# Patient Record
Sex: MALE | URBAN - METROPOLITAN AREA
[De-identification: names, ages, dates, MRNs, and addresses within clinical notes are randomized per-mention and may not be internally consistent; named-entity substitution may affect disease eponyms.]

---

## 2021-01-07 ENCOUNTER — APPOINTMENT (RX ONLY)
Dept: URBAN - METROPOLITAN AREA CLINIC 88 | Facility: CLINIC | Age: 26
Setting detail: DERMATOLOGY
End: 2021-01-07

## 2021-01-07 DIAGNOSIS — L66.11 CLASSIC LICHEN PLANOPILARIS: ICD-10-CM

## 2021-01-07 DIAGNOSIS — L64.8 OTHER ANDROGENIC ALOPECIA: ICD-10-CM

## 2021-01-07 DIAGNOSIS — L66.1 LICHEN PLANOPILARIS: ICD-10-CM

## 2021-01-07 PROBLEM — L30.9 DERMATITIS, UNSPECIFIED: Status: ACTIVE | Noted: 2021-01-07

## 2021-01-07 PROCEDURE — ? TREATMENT REGIMEN

## 2021-01-07 PROCEDURE — ? ADDITIONAL NOTES

## 2021-01-07 PROCEDURE — 99202 OFFICE O/P NEW SF 15 MIN: CPT | Mod: 25

## 2021-01-07 PROCEDURE — ? PRESCRIPTION

## 2021-01-07 PROCEDURE — ? COUNSELING

## 2021-01-07 PROCEDURE — ? BIOPSY BY PUNCH METHOD

## 2021-01-07 PROCEDURE — 11104 PUNCH BX SKIN SINGLE LESION: CPT

## 2021-01-07 RX ORDER — FINASTERIDE 1 MG/1
TABLET, FILM COATED ORAL QD
Qty: 90 | Refills: 1 | Status: ERX | COMMUNITY
Start: 2021-01-07

## 2021-01-07 RX ADMIN — FINASTERIDE: 1 TABLET, FILM COATED ORAL at 00:00

## 2021-01-07 ASSESSMENT — LOCATION DETAILED DESCRIPTION DERM
LOCATION DETAILED: LEFT CENTRAL FRONTAL SCALP
LOCATION DETAILED: RIGHT SUPERIOR MEDIAL FOREHEAD
LOCATION DETAILED: SUPERIOR MID FOREHEAD
LOCATION DETAILED: RIGHT CENTRAL FRONTAL SCALP

## 2021-01-07 ASSESSMENT — LOCATION SIMPLE DESCRIPTION DERM
LOCATION SIMPLE: SUPERIOR FOREHEAD
LOCATION SIMPLE: RIGHT SCALP
LOCATION SIMPLE: LEFT SCALP
LOCATION SIMPLE: RIGHT FOREHEAD

## 2021-01-07 ASSESSMENT — LOCATION ZONE DERM
LOCATION ZONE: FACE
LOCATION ZONE: SCALP

## 2021-01-07 NOTE — PROCEDURE: ADDITIONAL NOTES
Detail Level: Simple
Additional Notes: Hair loss not typical for male pattern baldness
Render Risk Assessment In Note?: no

## 2021-01-07 NOTE — PROCEDURE: BIOPSY BY PUNCH METHOD
Epidermal Sutures: 4-0 Nylon
Dressing: bandage
Was A Bandage Applied: Yes
Size Of Lesion In Cm (Optional): 0
Anesthesia Volume In Cc: 0.5
Post-Care Instructions: I reviewed with the patient in detail post-care instructions. Patient is to keep the biopsy site dry overnight, and then apply bacitracin twice daily until healed. Patient may apply hydrogen peroxide soaks to remove any crusting.
Patient Will Remove Sutures At Home?: No
Anesthesia Type: 1% lidocaine with epinephrine and a 1:10 solution of 8.4% sodium bicarbonate
Biopsy Type: H and E
Notification Instructions: Patient will be notified of biopsy results. However, patient instructed to call the office if not contacted within 2 weeks.
Wound Care: Petrolatum
Consent: Written consent was obtained and risks were reviewed including but not limited to scarring, infection, bleeding, scabbing, incomplete removal, nerve damage and allergy to anesthesia.
Suture Removal: 14 days
Detail Level: Detailed
Information: Selecting Yes will display possible errors in your note based on the variables you have selected. This validation is only offered as a suggestion for you. PLEASE NOTE THAT THE VALIDATION TEXT WILL BE REMOVED WHEN YOU FINALIZE YOUR NOTE. IF YOU WANT TO FAX A PRELIMINARY NOTE YOU WILL NEED TO TOGGLE THIS TO 'NO' IF YOU DO NOT WANT IT IN YOUR FAXED NOTE.
Billing Type: Third-Party Bill
Hemostasis: Pressure
Home Suture Removal Text: Patient was provided a home suture removal kit and will remove their sutures at home.  If they have any questions or difficulties they will call the office.
Punch Size In Mm: 4

## 2021-01-14 ENCOUNTER — APPOINTMENT (RX ONLY)
Dept: URBAN - METROPOLITAN AREA CLINIC 88 | Facility: CLINIC | Age: 26
Setting detail: DERMATOLOGY
End: 2021-01-14

## 2021-01-14 DIAGNOSIS — L21.8 OTHER SEBORRHEIC DERMATITIS: ICD-10-CM

## 2021-01-14 DIAGNOSIS — L64.8 OTHER ANDROGENIC ALOPECIA: ICD-10-CM

## 2021-01-14 PROCEDURE — ? ADDITIONAL NOTES

## 2021-01-14 PROCEDURE — ? COUNSELING

## 2021-01-14 PROCEDURE — ? PATHOLOGY DISCUSSION

## 2021-01-14 PROCEDURE — ? TREATMENT REGIMEN

## 2021-01-14 PROCEDURE — 99213 OFFICE O/P EST LOW 20 MIN: CPT

## 2021-01-14 ASSESSMENT — LOCATION DETAILED DESCRIPTION DERM
LOCATION DETAILED: LEFT CENTRAL FRONTAL SCALP
LOCATION DETAILED: SUPERIOR MID FOREHEAD
LOCATION DETAILED: RIGHT CENTRAL FRONTAL SCALP
LOCATION DETAILED: LEFT MEDIAL FRONTAL SCALP
LOCATION DETAILED: LEFT SUPERIOR MEDIAL FOREHEAD

## 2021-01-14 ASSESSMENT — LOCATION SIMPLE DESCRIPTION DERM
LOCATION SIMPLE: LEFT SCALP
LOCATION SIMPLE: SUPERIOR FOREHEAD
LOCATION SIMPLE: LEFT FOREHEAD
LOCATION SIMPLE: RIGHT SCALP

## 2021-01-14 ASSESSMENT — LOCATION ZONE DERM
LOCATION ZONE: SCALP
LOCATION ZONE: FACE

## 2021-01-14 NOTE — PROCEDURE: ADDITIONAL NOTES
Additional Notes: Hair loss not typical for male pattern baldness
Render Risk Assessment In Note?: no
Detail Level: Simple

## 2021-08-30 ENCOUNTER — RX ONLY (OUTPATIENT)
Age: 26
Setting detail: RX ONLY
End: 2021-08-30

## 2021-08-30 RX ORDER — FINASTERIDE 1 MG/1
TABLET, FILM COATED ORAL
Qty: 30 | Refills: 0 | Status: ERX

## 2021-09-09 ENCOUNTER — APPOINTMENT (RX ONLY)
Dept: URBAN - METROPOLITAN AREA CLINIC 88 | Facility: CLINIC | Age: 26
Setting detail: DERMATOLOGY
End: 2021-09-09

## 2021-09-09 DIAGNOSIS — L64.8 OTHER ANDROGENIC ALOPECIA: ICD-10-CM

## 2021-09-09 DIAGNOSIS — L20.89 OTHER ATOPIC DERMATITIS: ICD-10-CM

## 2021-09-09 DIAGNOSIS — L85.3 XEROSIS CUTIS: ICD-10-CM

## 2021-09-09 PROBLEM — L20.84 INTRINSIC (ALLERGIC) ECZEMA: Status: ACTIVE | Noted: 2021-09-09

## 2021-09-09 PROCEDURE — ? PRESCRIPTION

## 2021-09-09 PROCEDURE — ? TREATMENT REGIMEN

## 2021-09-09 PROCEDURE — 99214 OFFICE O/P EST MOD 30 MIN: CPT

## 2021-09-09 PROCEDURE — ? COUNSELING

## 2021-09-09 RX ORDER — FINASTERIDE 1 MG/1
TABLET, FILM COATED ORAL
Qty: 90 | Refills: 3 | Status: ERX | COMMUNITY
Start: 2021-09-09

## 2021-09-09 RX ORDER — TACROLIMUS 1 MG/G
OINTMENT TOPICAL
Qty: 30 | Refills: 3 | Status: ERX | COMMUNITY
Start: 2021-09-09

## 2021-09-09 RX ORDER — BETAMETHASONE DIPROPIONATE 0.5 MG/G
CREAM, AUGMENTED TOPICAL
Qty: 15 | Refills: 2 | Status: ERX | COMMUNITY
Start: 2021-09-09

## 2021-09-09 RX ADMIN — BETAMETHASONE DIPROPIONATE: 0.5 CREAM, AUGMENTED TOPICAL at 00:00

## 2021-09-09 RX ADMIN — TACROLIMUS: 1 OINTMENT TOPICAL at 00:00

## 2021-09-09 RX ADMIN — FINASTERIDE: 1 TABLET, FILM COATED ORAL at 00:00

## 2021-09-09 ASSESSMENT — LOCATION DETAILED DESCRIPTION DERM
LOCATION DETAILED: LEFT VENTRAL PROXIMAL FOREARM
LOCATION DETAILED: RIGHT CENTRAL MALAR CHEEK
LOCATION DETAILED: RIGHT SUPERIOR CENTRAL MALAR CHEEK
LOCATION DETAILED: RIGHT CENTRAL FRONTAL SCALP
LOCATION DETAILED: RIGHT VENTRAL PROXIMAL FOREARM
LOCATION DETAILED: LEFT MEDIAL FRONTAL SCALP
LOCATION DETAILED: LEFT ANTERIOR DISTAL UPPER ARM
LOCATION DETAILED: LEFT CENTRAL MALAR CHEEK
LOCATION DETAILED: LEFT SUPERIOR CENTRAL MALAR CHEEK
LOCATION DETAILED: LEFT CENTRAL FRONTAL SCALP

## 2021-09-09 ASSESSMENT — LOCATION ZONE DERM
LOCATION ZONE: ARM
LOCATION ZONE: SCALP
LOCATION ZONE: FACE

## 2021-09-09 ASSESSMENT — LOCATION SIMPLE DESCRIPTION DERM
LOCATION SIMPLE: LEFT SCALP
LOCATION SIMPLE: LEFT CHEEK
LOCATION SIMPLE: RIGHT SCALP
LOCATION SIMPLE: LEFT FOREARM
LOCATION SIMPLE: LEFT UPPER ARM
LOCATION SIMPLE: RIGHT CHEEK
LOCATION SIMPLE: RIGHT FOREARM

## 2021-09-09 NOTE — PROCEDURE: TREATMENT REGIMEN
Continue Regimen: Propecia 1mg QD
Initiate Treatment: Tacrolimus until rash is gone once a day then as needed. \\nBetamethasone BID rash is gone and then as needed.
Detail Level: Simple
Otc Regimen: Minoxidil 5%

## 2022-06-06 ENCOUNTER — APPOINTMENT (RX ONLY)
Dept: URBAN - METROPOLITAN AREA CLINIC 88 | Facility: CLINIC | Age: 27
Setting detail: DERMATOLOGY
End: 2022-06-06

## 2022-06-06 DIAGNOSIS — L20.89 OTHER ATOPIC DERMATITIS: ICD-10-CM

## 2022-06-06 DIAGNOSIS — L85.3 XEROSIS CUTIS: ICD-10-CM

## 2022-06-06 PROBLEM — L20.84 INTRINSIC (ALLERGIC) ECZEMA: Status: ACTIVE | Noted: 2022-06-06

## 2022-06-06 PROCEDURE — ? DEFER

## 2022-06-06 PROCEDURE — 99213 OFFICE O/P EST LOW 20 MIN: CPT

## 2022-06-06 PROCEDURE — ? TREATMENT REGIMEN

## 2022-06-06 PROCEDURE — ? COUNSELING

## 2022-06-06 ASSESSMENT — LOCATION DETAILED DESCRIPTION DERM
LOCATION DETAILED: LEFT VENTRAL PROXIMAL FOREARM
LOCATION DETAILED: LEFT ANTERIOR DISTAL UPPER ARM
LOCATION DETAILED: LEFT CENTRAL MALAR CHEEK
LOCATION DETAILED: RIGHT CENTRAL MALAR CHEEK
LOCATION DETAILED: LEFT SUPERIOR CENTRAL MALAR CHEEK
LOCATION DETAILED: RIGHT VENTRAL PROXIMAL FOREARM

## 2022-06-06 ASSESSMENT — LOCATION SIMPLE DESCRIPTION DERM
LOCATION SIMPLE: RIGHT CHEEK
LOCATION SIMPLE: LEFT FOREARM
LOCATION SIMPLE: LEFT CHEEK
LOCATION SIMPLE: RIGHT FOREARM
LOCATION SIMPLE: LEFT UPPER ARM

## 2022-06-06 ASSESSMENT — LOCATION ZONE DERM
LOCATION ZONE: ARM
LOCATION ZONE: FACE

## 2022-06-06 NOTE — PROCEDURE: TREATMENT REGIMEN
Otc Regimen: Zyrtec qhs
Continue Regimen: tacrolimus 0.1 % topical ointment \\nQuantity: 30.0 g  Days Supply: 30\\nSig: Apply to affected areas on face bid x 3 weeks then prn\\n\\nbetamethasone, augmented 0.05 % topical cream \\nQuantity: 15.0 g  Days Supply: 30\\nSig: Apply to affected areas on arms BID x 2 weeks, then use only as needed
Detail Level: Simple

## 2022-09-06 ENCOUNTER — RX ONLY (OUTPATIENT)
Age: 27
Setting detail: RX ONLY
End: 2022-09-06

## 2022-09-06 ENCOUNTER — APPOINTMENT (RX ONLY)
Dept: URBAN - METROPOLITAN AREA CLINIC 88 | Facility: CLINIC | Age: 27
Setting detail: DERMATOLOGY
End: 2022-09-06

## 2022-09-06 DIAGNOSIS — L85.3 XEROSIS CUTIS: ICD-10-CM

## 2022-09-06 DIAGNOSIS — L20.89 OTHER ATOPIC DERMATITIS: ICD-10-CM

## 2022-09-06 PROBLEM — L20.84 INTRINSIC (ALLERGIC) ECZEMA: Status: ACTIVE | Noted: 2022-09-06

## 2022-09-06 PROCEDURE — ? COUNSELING

## 2022-09-06 PROCEDURE — 99214 OFFICE O/P EST MOD 30 MIN: CPT

## 2022-09-06 PROCEDURE — ? DUPIXENT INITIATION

## 2022-09-06 PROCEDURE — ? TREATMENT REGIMEN

## 2022-09-06 PROCEDURE — ? DEFER

## 2022-09-06 RX ORDER — TACROLIMUS 1 MG/G
OINTMENT TOPICAL
Qty: 100 | Refills: 4 | Status: ERX | COMMUNITY
Start: 2022-09-06

## 2022-09-06 RX ORDER — BETAMETHASONE DIPROPIONATE 0.5 MG/G
OINTMENT TOPICAL
Qty: 45 | Refills: 2 | Status: ERX | COMMUNITY
Start: 2022-09-06

## 2022-09-06 ASSESSMENT — BSA ECZEMA: % BODY COVERED IN ECZEMA: 11

## 2022-09-06 ASSESSMENT — LOCATION SIMPLE DESCRIPTION DERM
LOCATION SIMPLE: LEFT UPPER ARM
LOCATION SIMPLE: RIGHT FOREARM
LOCATION SIMPLE: LEFT FOREARM
LOCATION SIMPLE: RIGHT HAND
LOCATION SIMPLE: RIGHT CHEEK
LOCATION SIMPLE: LEFT HAND
LOCATION SIMPLE: RIGHT PRETIBIAL REGION
LOCATION SIMPLE: LEFT PRETIBIAL REGION
LOCATION SIMPLE: LEFT CHEEK

## 2022-09-06 ASSESSMENT — LOCATION ZONE DERM
LOCATION ZONE: FACE
LOCATION ZONE: ARM
LOCATION ZONE: HAND
LOCATION ZONE: LEG

## 2022-09-06 ASSESSMENT — LOCATION DETAILED DESCRIPTION DERM
LOCATION DETAILED: LEFT VENTRAL PROXIMAL FOREARM
LOCATION DETAILED: RIGHT PROXIMAL PRETIBIAL REGION
LOCATION DETAILED: RIGHT CENTRAL MALAR CHEEK
LOCATION DETAILED: LEFT SUPERIOR CENTRAL MALAR CHEEK
LOCATION DETAILED: LEFT CENTRAL MALAR CHEEK
LOCATION DETAILED: LEFT RADIAL DORSAL HAND
LOCATION DETAILED: LEFT DISTAL DORSAL FOREARM
LOCATION DETAILED: RIGHT RADIAL DORSAL HAND
LOCATION DETAILED: RIGHT VENTRAL PROXIMAL FOREARM
LOCATION DETAILED: RIGHT PROXIMAL DORSAL FOREARM
LOCATION DETAILED: LEFT ANTERIOR DISTAL UPPER ARM
LOCATION DETAILED: LEFT PROXIMAL PRETIBIAL REGION

## 2022-09-06 ASSESSMENT — SEVERITY ASSESSMENT 2020: SEVERITY 2020: MODERATE

## 2022-09-06 NOTE — PROCEDURE: MIPS QUALITY
Quality 402: Tobacco Use And Help With Quitting Among Adolescents: Patient screened for tobacco and never smoked
Mouth/Nose
Detail Level: Detailed
Quality 431: Preventive Care And Screening: Unhealthy Alcohol Use - Screening: Patient screened for unhealthy alcohol use using a single question and scores less than 2 times per year

## 2022-12-08 ENCOUNTER — RX ONLY (OUTPATIENT)
Age: 27
Setting detail: RX ONLY
End: 2022-12-08

## 2022-12-08 RX ORDER — DUPILUMAB 300 MG/2ML
INJECTION, SOLUTION SUBCUTANEOUS
Qty: 1 | Refills: 5 | Status: ERX | COMMUNITY
Start: 2022-12-08

## 2023-02-27 ENCOUNTER — APPOINTMENT (RX ONLY)
Dept: URBAN - METROPOLITAN AREA CLINIC 88 | Facility: CLINIC | Age: 28
Setting detail: DERMATOLOGY
End: 2023-02-27

## 2023-02-27 DIAGNOSIS — L20.89 OTHER ATOPIC DERMATITIS: ICD-10-CM

## 2023-02-27 DIAGNOSIS — L85.3 XEROSIS CUTIS: ICD-10-CM

## 2023-02-27 DIAGNOSIS — L81.0 POSTINFLAMMATORY HYPERPIGMENTATION: ICD-10-CM

## 2023-02-27 PROBLEM — L20.84 INTRINSIC (ALLERGIC) ECZEMA: Status: ACTIVE | Noted: 2023-02-27

## 2023-02-27 PROCEDURE — ? ADDITIONAL NOTES

## 2023-02-27 PROCEDURE — ? COUNSELING

## 2023-02-27 PROCEDURE — ? TREATMENT REGIMEN

## 2023-02-27 PROCEDURE — 99214 OFFICE O/P EST MOD 30 MIN: CPT

## 2023-02-27 PROCEDURE — ? PRESCRIPTION

## 2023-02-27 PROCEDURE — ? DUPIXENT MONITORING

## 2023-02-27 RX ORDER — TACROLIMUS 1 MG/G
OINTMENT TOPICAL
Qty: 60 | Refills: 2 | Status: ERX

## 2023-02-27 ASSESSMENT — LOCATION SIMPLE DESCRIPTION DERM
LOCATION SIMPLE: LEFT HAND
LOCATION SIMPLE: LEFT UPPER ARM
LOCATION SIMPLE: RIGHT HAND
LOCATION SIMPLE: RIGHT FOREARM
LOCATION SIMPLE: LEFT FOREARM
LOCATION SIMPLE: RIGHT CHEEK
LOCATION SIMPLE: LEFT CHEEK
LOCATION SIMPLE: RIGHT PRETIBIAL REGION
LOCATION SIMPLE: LEFT PRETIBIAL REGION

## 2023-02-27 ASSESSMENT — ITCH NUMERIC RATING SCALE: HOW SEVERE IS YOUR ITCHING?: 2

## 2023-02-27 ASSESSMENT — LOCATION DETAILED DESCRIPTION DERM
LOCATION DETAILED: RIGHT PROXIMAL DORSAL FOREARM
LOCATION DETAILED: LEFT RADIAL DORSAL HAND
LOCATION DETAILED: RIGHT CENTRAL MALAR CHEEK
LOCATION DETAILED: RIGHT PROXIMAL PRETIBIAL REGION
LOCATION DETAILED: LEFT PROXIMAL PRETIBIAL REGION
LOCATION DETAILED: LEFT SUPERIOR CENTRAL MALAR CHEEK
LOCATION DETAILED: LEFT ANTERIOR DISTAL UPPER ARM
LOCATION DETAILED: RIGHT RADIAL DORSAL HAND
LOCATION DETAILED: LEFT DISTAL DORSAL FOREARM
LOCATION DETAILED: RIGHT VENTRAL PROXIMAL FOREARM
LOCATION DETAILED: LEFT CENTRAL MALAR CHEEK
LOCATION DETAILED: LEFT VENTRAL PROXIMAL FOREARM

## 2023-02-27 ASSESSMENT — LOCATION ZONE DERM
LOCATION ZONE: FACE
LOCATION ZONE: HAND
LOCATION ZONE: ARM
LOCATION ZONE: LEG

## 2023-02-27 ASSESSMENT — BSA ECZEMA: % BODY COVERED IN ECZEMA: 24

## 2023-02-27 ASSESSMENT — SEVERITY ASSESSMENT 2020: SEVERITY 2020: MILD

## 2023-02-27 NOTE — PROCEDURE: DUPIXENT MONITORING
Length Of Therapy: 1 month
Add High Risk Medication Management Associated Diagnosis?: No
Detail Level: Generalized

## 2023-02-27 NOTE — PROCEDURE: ADDITIONAL NOTES
Render Risk Assessment In Note?: no
Additional Notes: Patient reports mild lesions today. Recommend to only use topical PRN flare ups.
Detail Level: Simple

## 2023-02-27 NOTE — PROCEDURE: TREATMENT REGIMEN
Otc Regimen: Zyrtec qhs
Continue Regimen: tacrolimus 0.1 % topical ointment \\nQuantity: 30.0 g  Days Supply: 30\\nSig: Apply to affected areas on face bid x 3 weeks then prn\\n\\nbetamethasone, augmented 0.05 % topical cream \\nQuantity: 15.0 g  Days Supply: 30\\nSig: Apply to affected areas on arms BID x 2 weeks, then use only as needed
Detail Level: Simple
Plan: Explained to patient that PIH appears to be due to possible eczema. Recommend to use topical at this time.
Detail Level: Zone
Samples Given: Duobrii - apply to the affected area twice a day.

## 2024-09-15 NOTE — HPI: HAIR LOSS
August 9, 2022      To Whom It May Concern:      Richard Loza was seen in our Emergency Department today, 08/09/22.  I expect his condition to improve over the next 2 days.  He may return to work/school when improved.    Sincerely,        Alyson Leblanc RN        
How Did The Hair Loss Occur?: gradual in onset
How Severe Is Your Hair Loss?: moderate
126